# Patient Record
Sex: MALE
[De-identification: names, ages, dates, MRNs, and addresses within clinical notes are randomized per-mention and may not be internally consistent; named-entity substitution may affect disease eponyms.]

---

## 2022-02-07 PROBLEM — Z00.00 ENCOUNTER FOR PREVENTIVE HEALTH EXAMINATION: Status: ACTIVE | Noted: 2022-02-07

## 2022-02-14 ENCOUNTER — APPOINTMENT (OUTPATIENT)
Dept: PHARMACY | Facility: CLINIC | Age: 62
End: 2022-02-14
Payer: SELF-PAY

## 2022-02-14 PROCEDURE — V5010 ASSESSMENT FOR HEARING AID: CPT | Mod: NC

## 2022-02-22 ENCOUNTER — APPOINTMENT (OUTPATIENT)
Dept: PHARMACY | Facility: CLINIC | Age: 62
End: 2022-02-22
Payer: SELF-PAY

## 2022-02-22 PROCEDURE — V5010 ASSESSMENT FOR HEARING AID: CPT | Mod: NC

## 2022-03-22 ENCOUNTER — APPOINTMENT (OUTPATIENT)
Dept: PHARMACY | Facility: CLINIC | Age: 62
End: 2022-03-22
Payer: SELF-PAY

## 2022-03-22 PROCEDURE — V5261I: CUSTOM

## 2022-04-20 ENCOUNTER — APPOINTMENT (OUTPATIENT)
Dept: PHARMACY | Facility: CLINIC | Age: 62
End: 2022-04-20
Payer: SELF-PAY

## 2022-04-20 PROCEDURE — V5299A: CUSTOM | Mod: NC

## 2022-05-23 ENCOUNTER — APPOINTMENT (OUTPATIENT)
Dept: UROLOGY | Facility: CLINIC | Age: 62
End: 2022-05-23
Payer: COMMERCIAL

## 2022-05-23 VITALS
BODY MASS INDEX: 38.6 KG/M2 | OXYGEN SATURATION: 97 % | SYSTOLIC BLOOD PRESSURE: 146 MMHG | WEIGHT: 285 LBS | HEART RATE: 101 BPM | TEMPERATURE: 98.5 F | HEIGHT: 72 IN | DIASTOLIC BLOOD PRESSURE: 91 MMHG

## 2022-05-23 DIAGNOSIS — K57.90 DIVERTICULOSIS OF INTESTINE, PART UNSPECIFIED, W/OUT PERFORATION OR ABSCESS W/OUT BLEEDING: ICD-10-CM

## 2022-05-23 DIAGNOSIS — Z83.3 FAMILY HISTORY OF DIABETES MELLITUS: ICD-10-CM

## 2022-05-23 DIAGNOSIS — Z78.9 OTHER SPECIFIED HEALTH STATUS: ICD-10-CM

## 2022-05-23 DIAGNOSIS — K46.9 UNSPECIFIED ABDOMINAL HERNIA W/OUT OBSTRUCTION OR GANGRENE: ICD-10-CM

## 2022-05-23 DIAGNOSIS — Z87.891 PERSONAL HISTORY OF NICOTINE DEPENDENCE: ICD-10-CM

## 2022-05-23 DIAGNOSIS — Z86.79 PERSONAL HISTORY OF OTHER DISEASES OF THE CIRCULATORY SYSTEM: ICD-10-CM

## 2022-05-23 DIAGNOSIS — Z82.49 FAMILY HISTORY OF ISCHEMIC HEART DISEASE AND OTHER DISEASES OF THE CIRCULATORY SYSTEM: ICD-10-CM

## 2022-05-23 PROCEDURE — 76857 US EXAM PELVIC LIMITED: CPT

## 2022-05-23 PROCEDURE — 99204 OFFICE O/P NEW MOD 45 MIN: CPT | Mod: 25

## 2022-05-23 RX ORDER — AMLODIPINE BESYLATE 5 MG/1
5 TABLET ORAL
Qty: 90 | Refills: 0 | Status: ACTIVE | COMMUNITY
Start: 2022-03-14

## 2022-05-23 RX ORDER — TAMSULOSIN HYDROCHLORIDE 0.4 MG/1
0.4 CAPSULE ORAL
Qty: 90 | Refills: 0 | Status: ACTIVE | COMMUNITY
Start: 2022-02-02

## 2022-05-23 RX ORDER — METOPROLOL SUCCINATE 25 MG/1
25 TABLET, EXTENDED RELEASE ORAL
Qty: 90 | Refills: 0 | Status: ACTIVE | COMMUNITY
Start: 2022-02-22

## 2022-05-23 RX ORDER — APIXABAN 5 MG/1
5 TABLET, FILM COATED ORAL
Qty: 180 | Refills: 0 | Status: ACTIVE | COMMUNITY
Start: 2022-05-11

## 2022-05-23 RX ORDER — VALSARTAN AND HYDROCHLOROTHIAZIDE 320; 12.5 MG/1; MG/1
320-12.5 TABLET, FILM COATED ORAL
Qty: 90 | Refills: 0 | Status: ACTIVE | COMMUNITY
Start: 2022-01-06

## 2022-05-23 NOTE — PHYSICAL EXAM
[General Appearance - Well Developed] : well developed [General Appearance - Well Nourished] : well nourished [Normal Appearance] : normal appearance [Well Groomed] : well groomed [General Appearance - In No Acute Distress] : no acute distress [Edema] : no peripheral edema [Respiration, Rhythm And Depth] : normal respiratory rhythm and effort [Exaggerated Use Of Accessory Muscles For Inspiration] : no accessory muscle use [Abdomen Soft] : soft [Abdomen Tenderness] : non-tender [Costovertebral Angle Tenderness] : no ~M costovertebral angle tenderness [Penis Abnormality] : normal circumcised penis [Urinary Bladder Findings] : the bladder was normal on palpation [Scrotum] : the scrotum was normal [Testes Tenderness] : no tenderness of the testes [Testes Mass (___cm)] : there were no testicular masses [Rectal Exam - Rectum] : digital rectal exam was normal [Prostate Enlargement] : the prostate was not enlarged [Prostate Tenderness] : the prostate was not tender [No Prostate Nodules] : no prostate nodules [Normal Station and Gait] : the gait and station were normal for the patient's age [] : no rash [No Focal Deficits] : no focal deficits [Oriented To Time, Place, And Person] : oriented to person, place, and time [Affect] : the affect was normal [Mood] : the mood was normal [Not Anxious] : not anxious

## 2022-05-24 ENCOUNTER — NON-APPOINTMENT (OUTPATIENT)
Age: 62
End: 2022-05-24

## 2022-05-24 LAB
ANION GAP SERPL CALC-SCNC: 16 MMOL/L
APPEARANCE: CLEAR
BACTERIA: NEGATIVE
BASOPHILS # BLD AUTO: 0.05 K/UL
BASOPHILS NFR BLD AUTO: 0.5 %
BILIRUBIN URINE: NEGATIVE
BLOOD URINE: NEGATIVE
BUN SERPL-MCNC: 23 MG/DL
CALCIUM SERPL-MCNC: 9.7 MG/DL
CHLORIDE SERPL-SCNC: 107 MMOL/L
CO2 SERPL-SCNC: 21 MMOL/L
COLOR: YELLOW
CREAT SERPL-MCNC: 0.98 MG/DL
EGFR: 87 ML/MIN/1.73M2
EOSINOPHIL # BLD AUTO: 0.12 K/UL
EOSINOPHIL NFR BLD AUTO: 1.2 %
GLUCOSE QUALITATIVE U: NEGATIVE
GLUCOSE SERPL-MCNC: 89 MG/DL
HCT VFR BLD CALC: 50.9 %
HGB BLD-MCNC: 16.3 G/DL
HYALINE CASTS: 0 /LPF
IMM GRANULOCYTES NFR BLD AUTO: 0.4 %
KETONES URINE: NEGATIVE
LEUKOCYTE ESTERASE URINE: NEGATIVE
LYMPHOCYTES # BLD AUTO: 2.09 K/UL
LYMPHOCYTES NFR BLD AUTO: 20.4 %
MAN DIFF?: NORMAL
MCHC RBC-ENTMCNC: 29.1 PG
MCHC RBC-ENTMCNC: 32 GM/DL
MCV RBC AUTO: 90.7 FL
MICROSCOPIC-UA: NORMAL
MONOCYTES # BLD AUTO: 0.7 K/UL
MONOCYTES NFR BLD AUTO: 6.8 %
NEUTROPHILS # BLD AUTO: 7.26 K/UL
NEUTROPHILS NFR BLD AUTO: 70.7 %
NITRITE URINE: NEGATIVE
PH URINE: 5.5
PLATELET # BLD AUTO: 287 K/UL
POTASSIUM SERPL-SCNC: 4.3 MMOL/L
PROTEIN URINE: NEGATIVE
PSA SERPL-MCNC: 4.58 NG/ML
RBC # BLD: 5.61 M/UL
RBC # FLD: 13.2 %
RED BLOOD CELLS URINE: 1 /HPF
SODIUM SERPL-SCNC: 144 MMOL/L
SPECIFIC GRAVITY URINE: 1.02
SQUAMOUS EPITHELIAL CELLS: 0 /HPF
UROBILINOGEN URINE: NORMAL
WBC # FLD AUTO: 10.26 K/UL
WHITE BLOOD CELLS URINE: 1 /HPF

## 2022-05-24 NOTE — ADDENDUM
[FreeTextEntry1] : I, Dr. Kong, personally performed the evaluation and management (E/M) services for this new patient.  That E/M includes conducting the initial examination, assessing all conditions, and establishing the plan of care.  Today, my ACP, Nikki Payton NP, was here to observe my evaluation and management services for this patient to be followed going forward.\par

## 2022-05-24 NOTE — HISTORY OF PRESENT ILLNESS
[FreeTextEntry1] : Dear ZACK Sanchez,\par \par Thank you so much for the referral to help care for your patient.\par \par Chief Complaint: PAE consult\par Date of first visit:2022\par \par ANKIT ELMORE is a 61 year old  male with a PMHx HTN, atrial fibrillation, and BPH who presents for PAE consult. His PSA is 8.56 ng/ml. PSAD is normal (0.02 ng/ml/cc). He is on dutasteride and tamsulosin. He has a history of hydronephrosis due to BPH. \par  \par He denies frequency, urgency, and incomplete emptying. Moderate FOS. Occasional dribbling. Leaks urine occasionally, does not need pads. Nocturia- x1. He denies dysuria and hematuria. \par \par No issues with erections. He reports delayed ejaculation. \par \par 10 pack year smoking history.\par \par PSA Hx:\par 8.56 22 (corrected for dutasteride)\par 10.8 21 (corrected for dutasteride)\par 12.2 05/15/21 (corrected for dutasteride)\par 16.24 12/15/20  (started dutasteride)\par \par MRI\par MRI 2020. 292 cc prostate with PIRADS 4 lesion to the left posteromedial midgland peripheral zone measuring 21 x 7 mm. PIRADS 2 measuring 18 x 14 mm to the right posterior midgland transition zone. No LAD No EPE, No Bony Lesions.  The images have been reviewed and clinical implications discussed with the patient.\par \par 2022\par IPSS 11 QOL 2 \par SADIE 19\par FlowPVR: Max: 13.5 Av.6 ml/s VV-193 ml PVR- 66 ml\par \par The patient denies fevers, chills, nausea and or vomiting and no unexplained weight loss.\par \par All pertinent laboratory, films and physician notes were reviewed.  Questionnaire results were discussed with patient.\par \par I discussed with the patient and reviewed the risks and benefits and alternatives to prostate artery embolization. Time was spent with the patient discussing alternative therapies including TURP, Urolift, medical therapy, laser, HoLEP, SPP and embolization.\par \par We discussed the early result and success of the procedure in general as well as my personal results. Specifically, reviewed risk related to nontarget embolization most commonly involving the bladder and/or rectum. We also spoke about the potential for post procedure obstruction required murphy catheter drainage. We reviewed some technical aspects up of the procedure including embolic material utilizing the importance of cone beam CT prior to embolization. The patient wishes procedure scheduling a procedure.\par \par Specific risks of the embolization procedure which were discussed with the patient including infection, bleeding, dysuria, hematuria, hematospermia, non-target embolization which would result in damage to bladder wall leading to ulceration/ischemia, rectal wall injury, and injury to penis and ejaculatory mechanism. These risks are considered to be low. Pelvic pain and burning is not uncommon and considered to be mild and transient. The patient understands that a Murphy catheter may be inserted during the procedure to help guide embolization and will be removed at the end of the procedure. A small percentage of patients will have some degree of urinary retention after embolization and will require a catheter to be left in. He understands this. We also discussed that long-term results of prostate embolization are unknown but response rates in reduction of his IPSS score are 80-85% based on current literature and state of the art techniques.\par \par It was explained to the patient that approximate 5% patient experienced some degree bruising following femoral or radial artery catheterization. The hematoma is benign and resolves spontaneously under typical circumstances. The specific benefits and risks of transradial (TR) access versus transfemoral (TF) access were discussed in detail with the patient. This includes decreased risk of bleeding, immediate ambulation and faster discharge time. Potential increased and extremely remote risk of cerebral emboli was discussed. The patient was given information packet with further details. The patient does indicate preference for transradial access during the procedure.\par \par \par \par

## 2022-05-24 NOTE — ASSESSMENT
[FreeTextEntry1] : 61 year old  male with a PMHx HTN, atrial fibrillation, and BPH who presents for PAE consult. His PSA is 8.56 ng/ml. PSAD is normal (0.02 ng/ml/cc). He is on dutasteride and tamsulosin. He has a history of hydronephrosis due to BPH.\par  \par 6'2\par 3 mm left radial artery \par Barbau B  \par Pre void bladder volume : 250.16 cc\par Post void bladder residual: 66.33 cc\par Bladder wall thickness: 0.16 cm\par Prostate gland measured : 8.3 cm x 6.6 cm x 7.4 cm\par \par Prostate gland volume: 211.37 cc\par 1. CBC, BMP, PSA, Covid Test, UA UCx, EKG, CXR\par 2. Cardiac Clearance\par 3. PAE at [facility]\par 4. PIRADS 4 lesion- Repeat MRI at [facility] \par 5. Stop dutasteride at least 2 weeks before procedure. \par 6. Plan for murphy at least 7 days post op. \par \par Thank you very much for allowing me to assist in the care of this patient. Should you have any additional questions or concerns please do not hesitate to contact me.\par \par \par Sincerely,\par \par \par Rashad Kong D.O.\par  of Urology and Radiology\par  of Urology at [facility]\par System Director for Prostate Cancer\par [address] [facility], [address]\par Phone: [phone]\par

## 2022-05-25 ENCOUNTER — NON-APPOINTMENT (OUTPATIENT)
Age: 62
End: 2022-05-25

## 2022-05-25 LAB — BACTERIA UR CULT: NORMAL

## 2022-06-07 ENCOUNTER — NON-APPOINTMENT (OUTPATIENT)
Age: 62
End: 2022-06-07

## 2022-06-20 ENCOUNTER — RESULT REVIEW (OUTPATIENT)
Age: 62
End: 2022-06-20

## 2022-06-20 ENCOUNTER — OUTPATIENT (OUTPATIENT)
Dept: OUTPATIENT SERVICES | Facility: HOSPITAL | Age: 62
LOS: 1 days | End: 2022-06-20

## 2022-06-20 ENCOUNTER — APPOINTMENT (OUTPATIENT)
Dept: MRI IMAGING | Facility: CLINIC | Age: 62
End: 2022-06-20
Payer: COMMERCIAL

## 2022-06-20 PROCEDURE — 72197 MRI PELVIS W/O & W/DYE: CPT | Mod: 26

## 2022-06-21 ENCOUNTER — NON-APPOINTMENT (OUTPATIENT)
Age: 62
End: 2022-06-21

## 2022-06-25 ENCOUNTER — TRANSCRIPTION ENCOUNTER (OUTPATIENT)
Age: 62
End: 2022-06-25

## 2022-06-27 ENCOUNTER — APPOINTMENT (OUTPATIENT)
Dept: UROLOGY | Facility: CLINIC | Age: 62
End: 2022-06-27

## 2022-06-27 VITALS
SYSTOLIC BLOOD PRESSURE: 136 MMHG | HEART RATE: 92 BPM | OXYGEN SATURATION: 97 % | DIASTOLIC BLOOD PRESSURE: 82 MMHG | TEMPERATURE: 97.7 F

## 2022-06-27 PROCEDURE — 99214 OFFICE O/P EST MOD 30 MIN: CPT

## 2022-06-27 NOTE — PHYSICAL EXAM
[General Appearance - Well Developed] : well developed [General Appearance - Well Nourished] : well nourished [Normal Appearance] : normal appearance [Well Groomed] : well groomed [General Appearance - In No Acute Distress] : no acute distress [] : no respiratory distress [Respiration, Rhythm And Depth] : normal respiratory rhythm and effort [Exaggerated Use Of Accessory Muscles For Inspiration] : no accessory muscle use [Oriented To Time, Place, And Person] : oriented to person, place, and time [Affect] : the affect was normal [Mood] : the mood was normal [Normal Station and Gait] : the gait and station were normal for the patient's age

## 2022-06-28 NOTE — HISTORY OF PRESENT ILLNESS
[FreeTextEntry1] : Dear ZACK Sanchez\par \par Thank you so much for the referral to help care for your patient.\par \par Chief Complaint: MRI Review\par Date of first visit:2022\par \par ANKIT ELMROE is a 61 year old  male with a PMHx HTN, atrial fibrillation, and BPH who is scheduled for bilateral PAE 22, but presents for MRI review today. A prostate MRI done on 2022 demonstrated a PIRADS 4 lesion to the left posteromedial midgland peripheral zone, and a PIRADS 2 lesion to the right posterior midgland transition zone. His most recent prostate MRI done on 22 was read as PIRADS 1. His PSA is 9.16 ng/ml. PSAD is normal (0.017 ng/ml/cc). He is on dutasteride and tamsulosin. He has a history of hydronephrosis due to BPH. \par  \par He denies frequency, urgency, and incomplete emptying. Moderate FOS. Occasional dribbling. Leaks urine occasionally, does not need pads. Nocturia- x1. He denies dysuria and hematuria. \par \par No issues with erections. He reports delayed ejaculation. \par \par 10 pack year smoking history.\par \par PSA Hx:\par 9.16 2022 (corrected for dutasteride) (PSAD 0.017 ng/ml/cc)\par 8.56 22 (corrected for dutasteride)\par 10.8 21 (corrected for dutasteride)\par 12.2 05/15/21 (corrected for dutasteride)\par 16.24 12/15/20  (started dutasteride)\par \par MRI\par MRI read 2022. 259.6 cc prostate with PIRADS 1 no suspicious lesions identified. No LAD No EPE, No Bony Lesions.  The images have been reviewed and clinical implications discussed with the patient.\par \par MRI 2020. 292 cc prostate with PIRADS 4 lesion to the left posteromedial midgland peripheral zone measuring 21 x 7 mm. PIRADS 2 measuring 18 x 14 mm to the right posterior midgland transition zone. No LAD No EPE, No Bony Lesions.  The images have been reviewed and clinical implications discussed with the patient.\par \par \par 2022\par IPSS 13  QOL 2\par SADIE 19\par \par 2022\par IPSS 11 QOL 2 \par SADIE 19\par FlowPVR: Max: 13.5 Av.6 ml/s VV-193 ml PVR- 66 ml\par \par The patient denies fevers, chills, nausea and or vomiting and no unexplained weight loss.\par \par All pertinent laboratory, films and physician notes were reviewed.  Questionnaire results were discussed with patient.\par \par I discussed with the patient and reviewed the risks and benefits and alternatives to prostate artery embolization. []  minutes was spent with the patient discussing alternative therapies including TURP, urolift, medical therapy, laser, HoLEP, SPP and embolization. \par \par We discussed the early result and success of the procedure in general as well as my personal results. Specifically, reviewed risk related to nontarget embolization most commonly involving the bladder and/or rectum. We also spoke about the potential for post procedure obstruction required murphy catheter drainage. We reviewed some technical aspects up of the procedure including embolic material utilizing the importance of cone beam CT prior to embolization. The patient wishes procedure scheduling a procedure.\par \par Specific risks of the embolization procedure which were discussed with the patient including infection, bleeding, dysuria, hematuria, hematospermia, non-target embolization which would result in damage to bladder wall leading to ulceration/ischemia, rectal wall injury, and injury to penis and ejaculatory mechanism.  These risks are considered to be low.  Pelvic pain and burning is not uncommon and considered to be mild and transient. The patient understands that a Murphy catheter may be inserted during the procedure to help guide embolization and will be removed at the end of the procedure. A small percentage of patients will have some degree of urinary retention after embolization and will require a catheter to be left in. He understands this.  We also discussed that long-term results of prostate embolization are unknown but response rates in reduction of his IPSS score are 80-85% based on current literature and state of the art techniques. \par \par It was explained to the patient that approximate 5% patient experienced some degree bruising following femoral or radial artery catheterization.  The hematoma is benign and resolves spontaneously under typical circumstances. The specific benefits and risks of transradial (TR) access versus transfemoral (TF) access were discussed in detail with the patient. This includes decreased risk of bleeding, immediate ambulation and faster discharge time. Potential increased and extremely remote risk of cerebral emboli was discussed.  The patient was given information packet with further details. The patient does indicate preference for transradial access during the procedure.  We also discussed use of NBCA.\par \par \par \par \par \par

## 2022-06-28 NOTE — ASSESSMENT
[FreeTextEntry1] : 61 year old  male with a PMHx HTN, atrial fibrillation, and BPH who is scheduled for bilateral PAE 7/12/22, but presents for MRI review today. A prostate MRI done on 12/23/2022 demonstrated a PIRADS 4 lesion to the left posteromedial midgland peripheral zone, and a PIRADS 2 lesion to the right posterior midgland transition zone. His most recent prostate MRI done on 06/21/22 was read as PIRADS 1. His PSA is 9.16 ng/ml. PSAD is normal (0.017 ng/ml/cc). He is on dutasteride and tamsulosin. \par  \par 6'2\par 3 mm left radial artery \par Barbau B  \par Pre void bladder volume : 250.16 cc\par Post void bladder residual: 66.33 cc\par Bladder wall thickness: 0.16 cm\par Prostate gland measured : 8.3 cm x 6.6 cm x 7.4 cm\par Prostate gland volume: 211.37 cc\par \par 1. CBC, BMP, PSA, Covid Test, UA UCx, EKG, CXR\par 2. Cardiac Clearance. Hold Eliquis for 3 full days prior to procedure per his cardiologist. \par 3. PAE at [facility] using NBCA\par 4. MR Prostate - PIRADS 1\par 5. Stop dutasteride at least 2 weeks before procedure. (Last dose is today, 06/27/22)\par 6. Plan for murphy 6 days post op, Monday removal. \par \par Thank you very much for allowing me to assist in the care of this patient. Should you have any additional questions or concerns please do not hesitate to contact me.\par \par \par Sincerely,\par \par \par Rasahd Kong D.O.\par  of Urology and Radiology\par  of Urology at [facility]\par System Director for Prostate Cancer\par [address] [facility], [facility]\par Phone: [phone]

## 2022-07-07 ENCOUNTER — NON-APPOINTMENT (OUTPATIENT)
Age: 62
End: 2022-07-07

## 2022-07-07 LAB — BACTERIA UR CULT: NORMAL

## 2022-07-12 ENCOUNTER — OUTPATIENT (OUTPATIENT)
Dept: OUTPATIENT SERVICES | Facility: HOSPITAL | Age: 62
LOS: 1 days | End: 2022-07-12
Payer: COMMERCIAL

## 2022-07-12 ENCOUNTER — RESULT REVIEW (OUTPATIENT)
Age: 62
End: 2022-07-12

## 2022-07-12 ENCOUNTER — APPOINTMENT (OUTPATIENT)
Dept: UROLOGY | Facility: HOSPITAL | Age: 62
End: 2022-07-12

## 2022-07-12 ENCOUNTER — APPOINTMENT (OUTPATIENT)
Dept: INTERVENTIONAL RADIOLOGY/VASCULAR | Facility: HOSPITAL | Age: 62
End: 2022-07-12

## 2022-07-12 PROCEDURE — 76937 US GUIDE VASCULAR ACCESS: CPT | Mod: 26

## 2022-07-12 PROCEDURE — 37243 VASC EMBOLIZE/OCCLUDE ORGAN: CPT

## 2022-07-12 PROCEDURE — C1889: CPT

## 2022-07-12 PROCEDURE — C1894: CPT

## 2022-07-12 PROCEDURE — C1887: CPT

## 2022-07-12 PROCEDURE — 76937 US GUIDE VASCULAR ACCESS: CPT

## 2022-07-12 PROCEDURE — 36247 INS CATH ABD/L-EXT ART 3RD: CPT

## 2022-07-12 PROCEDURE — C1769: CPT

## 2022-07-12 PROCEDURE — 36247 INS CATH ABD/L-EXT ART 3RD: CPT | Mod: 59

## 2022-07-13 ENCOUNTER — EMERGENCY (EMERGENCY)
Facility: HOSPITAL | Age: 62
LOS: 1 days | Discharge: HOME | End: 2022-07-13
Attending: EMERGENCY MEDICINE | Admitting: EMERGENCY MEDICINE
Payer: COMMERCIAL

## 2022-07-13 ENCOUNTER — NON-APPOINTMENT (OUTPATIENT)
Age: 62
End: 2022-07-13

## 2022-07-13 VITALS
DIASTOLIC BLOOD PRESSURE: 82 MMHG | OXYGEN SATURATION: 97 % | HEIGHT: 74 IN | WEIGHT: 265 LBS | HEART RATE: 115 BPM | SYSTOLIC BLOOD PRESSURE: 150 MMHG | TEMPERATURE: 98 F | RESPIRATION RATE: 20 BRPM

## 2022-07-13 VITALS
DIASTOLIC BLOOD PRESSURE: 84 MMHG | RESPIRATION RATE: 20 BRPM | SYSTOLIC BLOOD PRESSURE: 142 MMHG | TEMPERATURE: 98 F | OXYGEN SATURATION: 98 % | HEART RATE: 98 BPM

## 2022-07-13 DIAGNOSIS — R33.9 RETENTION OF URINE, UNSPECIFIED: ICD-10-CM
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
## 2022-07-13 DIAGNOSIS — N40.0 BENIGN PROSTATIC HYPERPLASIA WITHOUT LOWER URINARY TRACT SYMPTOMS: ICD-10-CM

## 2022-07-13 DIAGNOSIS — T83.038A LEAKAGE OF OTHER URINARY CATHETER, INITIAL ENCOUNTER: ICD-10-CM
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
## 2022-07-13 PROCEDURE — 99284 EMERGENCY DEPT VISIT MOD MDM: CPT | Mod: 25

## 2022-07-13 PROCEDURE — 51702 INSERT TEMP BLADDER CATH: CPT

## 2022-07-13 PROCEDURE — 99283 EMERGENCY DEPT VISIT LOW MDM: CPT

## 2022-07-13 PROCEDURE — 99282 EMERGENCY DEPT VISIT SF MDM: CPT

## 2022-07-13 NOTE — ED PROVIDER NOTE - ATTENDING APP SHARED VISIT CONTRIBUTION OF CARE
I discussed the care of the pt directly with the ACP while the pt was in the ED. I have reviewed the ACP note and agree w/ the history, exam and plan of care.

## 2022-07-13 NOTE — ED ADULT NURSE NOTE - CHIEF COMPLAINT QUOTE
Pt reports burning with urination, and urinating around murphy catheter. Pt reports murphy cath placed today after prostate sx.

## 2022-07-13 NOTE — ED PROVIDER NOTE - PATIENT PORTAL LINK FT
You can access the FollowMyHealth Patient Portal offered by [facility] by registering at the following website: [email or web address]. By joining [employer]’s FollowMyHealth portal, you will also be able to view your health information using other applications (apps) compatible with our system.

## 2022-07-13 NOTE — ED PROVIDER NOTE - PHYSICAL EXAMINATION
Constitutional : Well appearing, non-toxic, no acute distress. calm and cooperative. awake, alert, oriented to person, place, time/situation.  Head : head normocephalic, atraumatic  EENMT : eyes clear bilaterally, PERRL, EOMI. airway patent. moist mucous membranes. neck supple.  Cardiac :  Extremities warm and well perfused. 2+ radial and DP pulses. cap refill <2 seconds  Resp : Respirations even and unlabored.   Gastro : abdomen soft, nontender, nondistended. no rebound or guarding. no CVAT.  MSK :  range of motion is not limited, no muscle or joint tenderness  Neuro : Alert and oriented, no focal deficits, no motor or sensory deficits.  Skin : Skin normal color for race, warm, dry and intact. No evidence of rash.

## 2022-07-13 NOTE — ED ADULT NURSE REASSESSMENT NOTE - NS ED NURSE REASSESS COMMENT FT1
RN attempted to flush murphy per MD orders but unable to due to patient reporting pain. MD made aware. pt pending urology consult. pt verbalizes understanding of plan of care

## 2022-07-13 NOTE — CONSULT NOTE ADULT - SUBJECTIVE AND OBJECTIVE BOX
HPI: 60 yo male with BPH- s/p bilateral PAE 7/12/2022. Patient went home after procedure with murphy catheter. Patient noticed few hours after returning home that murphy wasn't draining well and was draining light pink urine around murphy. No fever/chills. No N/V. Murphy not flushing in ER.      PAST MEDICAL & SURGICAL HISTORY:      MEDICATIONS  (STANDING):    MEDICATIONS  (PRN):      Allergies    No Known Allergies    Intolerances        SOCIAL HISTORY:    FAMILY HISTORY:      Vital Signs Last 24 Hrs  T(C): 36.8 (13 Jul 2022 02:02), Max: 36.9 (13 Jul 2022 00:43)  T(F): 98.3 (13 Jul 2022 02:02), Max: 98.5 (13 Jul 2022 00:43)  HR: 98 (13 Jul 2022 02:02) (98 - 115)  BP: 142/84 (13 Jul 2022 02:02) (142/84 - 150/82)  BP(mean): --  RR: 20 (13 Jul 2022 02:02) (20 - 20)  SpO2: 98% (13 Jul 2022 02:02) (97% - 98%)    Parameters below as of 13 Jul 2022 02:02  Patient On (Oxygen Delivery Method): room air        On PE:  General: alert and awake  Abdomen: soft, NT, ND    : mild SP discomfort. FC #16fr intact. No urine noted in bag.     EXT: no edema    LABS: n/a                RADIOLOGY & ADDITIONAL STUDIES:

## 2022-07-13 NOTE — CONSULT NOTE ADULT - PROBLEM SELECTOR RECOMMENDATION 9
-murphy removed and #18fr murphy placed. Drained about 300cc clear urine.  -d/c to home with murphy to leg bag.   -f/u Dr. Kong monday for TOV

## 2022-07-13 NOTE — ED PROVIDER NOTE - NSFOLLOWUPINSTRUCTIONS_ED_ALL_ED_FT
You were seen today for a catheter problem.   Our urology team saw you in the ED tonight and swapped the murphy.    Follow up with Dr Arevalo as previously scheduled.     Return to the Emergency Department for persistent, worsening or new symptoms including fever/chills, severe abdominal pain, blood in your urine or blood clots or if the murphy stops working / draining for any reason, uncontrollable nausea/vomiting, or any other concerns.

## 2022-07-13 NOTE — ED PROVIDER NOTE - CLINICAL SUMMARY MEDICAL DECISION MAKING FREE TEXT BOX
pt w history BPH- s/p bilateral PAE yesterday (7/12/22) w Dr. Kong. discharged w murphy. now here w urine leaking around catheter. no abd pain, n/v. no flank pain. pink tinged urine, no hematuria.   pt well appearing on arrival, comfortable. small amount pink tinged urine in murphy tubing.   will attempt to flush in ED, if unsuccessful will call urology to swap murphy given recent procedure/ instrumentation.   no indication for labs as pt was never retaining urine, had been leaking around murphy.

## 2022-07-13 NOTE — ED PROVIDER NOTE - PROGRESS NOTE DETAILS
attempted to flush murphy in ED. unsuccessful.   urology consulted. swapped murphy to larger side.  murphy draining without difficulty now.   pt ok for dc and continued outpt follow up with uro as already planned.     Discharge plan and return precautions d/w pt who verbalized understanding and agrees with plan. All questions answered. Vitals WNL. Ready for d/c.

## 2022-07-13 NOTE — ED PROVIDER NOTE - OBJECTIVE STATEMENT
61 yr old male, history of BPH- s/p bilateral PAE yesterday (7/12/22) w Dr. Kong, presents to the Emergency Department with chest pain. Pt was discharged this afternoon with murphy in place. no issues with murphy initially. this evening pt noticed murphy wasn't draining well. Urine was leaking around the murphy. pink tinged urine. no hematuria, no clots. no pain or swelling. no suprapubic pain / abdominal pain / flank pain.   urine has always been draining. was never retaining.

## 2022-07-13 NOTE — ED PROVIDER NOTE - NS ED MD DISPO DISCHARGE CCDA
Pharmacy requesting medication refill. Please approve or deny this request.    Rx requested:  Requested Prescriptions     Pending Prescriptions Disp Refills    metoprolol tartrate (LOPRESSOR) 50 MG tablet [Pharmacy Med Name: Metoprolol Tartrate Oral Tablet 50 MG] 180 tablet 0     Sig: TAKE ONE-HALF TABLET BY MOUTH TWICE DAILY         Last Office Visit:   4/19/2019      Next Visit Date:  No future appointments.
Patient/Caregiver provided printed discharge information.

## 2022-07-13 NOTE — ED ADULT NURSE NOTE - OBJECTIVE STATEMENT
Pt reports burning with urination, and urinating around murphy catheter. Pt reports murphy cath placed today after prostate sx.    triage noted as above    pt states that he had 2 episodes of voiding clear yellow urine into murphy bag. then a couple of hours ago he had an episode where half of "rohit colored" urine went into the murphy bag and half came out around the catheter. states he is concerned for infection and thinks there might be a clot blocking the catheter. denies any fevers/chills, abnormal discharge or odor.

## 2022-07-14 PROCEDURE — 51702 INSERT TEMP BLADDER CATH: CPT

## 2022-07-15 ENCOUNTER — APPOINTMENT (OUTPATIENT)
Dept: UROLOGY | Facility: CLINIC | Age: 62
End: 2022-07-15

## 2022-07-15 PROCEDURE — 99024 POSTOP FOLLOW-UP VISIT: CPT

## 2022-07-16 ENCOUNTER — EMERGENCY (EMERGENCY)
Facility: HOSPITAL | Age: 62
LOS: 1 days | Discharge: HOME | End: 2022-07-16
Attending: EMERGENCY MEDICINE | Admitting: EMERGENCY MEDICINE
Payer: COMMERCIAL

## 2022-07-16 VITALS
WEIGHT: 274.92 LBS | OXYGEN SATURATION: 96 % | RESPIRATION RATE: 16 BRPM | TEMPERATURE: 98 F | SYSTOLIC BLOOD PRESSURE: 111 MMHG | DIASTOLIC BLOOD PRESSURE: 64 MMHG | HEART RATE: 88 BPM | HEIGHT: 74 IN

## 2022-07-16 DIAGNOSIS — T83.9XXA UNSPECIFIED COMPLICATION OF GENITOURINARY PROSTHETIC DEVICE, IMPLANT AND GRAFT, INITIAL ENCOUNTER: ICD-10-CM

## 2022-07-16 PROCEDURE — 99284 EMERGENCY DEPT VISIT MOD MDM: CPT

## 2022-07-16 PROCEDURE — 51702 INSERT TEMP BLADDER CATH: CPT

## 2022-07-16 NOTE — CONSULT NOTE ADULT - PROBLEM SELECTOR RECOMMENDATION 9
-not retaining, stat lock placed to keep murphy in position  -will keep his follow up appt. on Monday for trial of void

## 2022-07-16 NOTE — ED PROVIDER NOTE - PROGRESS NOTE DETAILS
- advanced murphy and placed more in balloon.  +150cc urine output. bedside US - +murphy balloon within bladder, no evidence of urinary retention    I have discussed the discharge plan with the patient. The patient agrees with the plan, as discussed.  The patient understands Emergency Department diagnosis is a preliminary diagnosis often based on limited information and that the patient must adhere to the follow-up plan as discussed.  The patient understands that if the symptoms worsen the patient may return to the Emergency Department at any time for further evaluation and treatment.

## 2022-07-16 NOTE — ED ADULT TRIAGE NOTE - CHIEF COMPLAINT QUOTE
pt c/o murphy catheter slipping out.  pt s/p prostate embolization  7/12/22.  catheter is still in place, but ~4-5 inches lower than it usually is and not draining, per pt.

## 2022-07-16 NOTE — ED PROVIDER NOTE - PATIENT PORTAL LINK FT
You can access the FollowMyHealth Patient Portal offered by [facility] by registering at the following website: [email or web address]. By joining [employer]’s FollowMyHealth portal, you will also be able to view your health information using other applications (apps) compatible with our system.

## 2022-07-16 NOTE — ED ADULT NURSE NOTE - OBJECTIVE STATEMENT
pt had prostate embolisation on monday, murphy placed and stopped draining on 7/14, visited ER and had murphy replaced from 16Fr to 18Fr.  pt has appt with urologist on monday, murphy stopped draining again and has been "slipping out".  denies pain, fever, chills, hematuria, n/v

## 2022-07-16 NOTE — CONSULT NOTE ADULT - SUBJECTIVE AND OBJECTIVE BOX
61 y.o. M patient s/p PAE 7/12. Here to have murphy checked.  Was seen in ER 7/16 and was upsized to 18F for malfunctioning murphy.  Subsequently seen in office yesterday with concern for murphy not draining well.  It was readjusted in office and patient sent home.  He presents today with concerns that murphy was pulled down because he doesn't have a stat lock and feels that urine output is lower.    PAST MEDICAL & SURGICAL HISTORY:  BPH  HTN     ALLERGIES:  No Known Allergies    PHYSICAL EXAM:  Vital Signs Last 24 Hrs  T(C): 36.9 (16 Jul 2022 03:14), Max: 36.9 (16 Jul 2022 03:14)  T(F): 98.5 (16 Jul 2022 03:14), Max: 98.5 (16 Jul 2022 03:14)  HR: 88 (16 Jul 2022 03:14) (88 - 88)  BP: 111/64 (16 Jul 2022 03:14) (111/64 - 111/64)  BP(mean): --  RR: 16 (16 Jul 2022 03:14) (16 - 16)  SpO2: 96% (16 Jul 2022 03:14) (96% - 96%)    Parameters below as of 16 Jul 2022 03:14  Patient On (Oxygen Delivery Method): room air    Gen: NAD, AAOx3  : 18F murphy in place.  At time of initial exam, clear yellow urine draining in tubing. Murphy flushing well.  Approximately 60ccc urine removed from bladder.  Adjusted murphy with 25cc in balloon, placed a stat lock to keep it in appropriate position.      Bedside ultrasound shows murphy appropriately in bladder, minimal urine.         61 y.o. M patient s/p PAE 7/12. Here to have murphy checked.  Was seen in ER 7/16 and was upsized to 18F for malfunctioning murphy.  Subsequently seen in office yesterday with concern for murphy not draining well.  It was readjusted in office and patient sent home.  He presents today with concerns that murphy was pulled down because he doesn't have a stat lock and feels that urine output is lower.    PAST MEDICAL & SURGICAL HISTORY:  BPH  HTN     ALLERGIES:  No Known Allergies    PHYSICAL EXAM:  Vital Signs Last 24 Hrs  T(C): 36.9 (16 Jul 2022 03:14), Max: 36.9 (16 Jul 2022 03:14)  T(F): 98.5 (16 Jul 2022 03:14), Max: 98.5 (16 Jul 2022 03:14)  HR: 88 (16 Jul 2022 03:14) (88 - 88)  BP: 111/64 (16 Jul 2022 03:14) (111/64 - 111/64)  BP(mean): --  RR: 16 (16 Jul 2022 03:14) (16 - 16)  SpO2: 96% (16 Jul 2022 03:14) (96% - 96%)    Parameters below as of 16 Jul 2022 03:14  Patient On (Oxygen Delivery Method): room air    Gen: NAD, AAOx3  : 18F murphy in place.  At time of initial exam, clear yellow urine draining in tubing. Murphy flushing well.  Approximately 60ccc urine removed from bladder. Cleaned tubing with chloroprep. Adjusted murphy with 25cc in balloon, placed a stat lock to keep it in appropriate position.      Bedside ultrasound shows murphy appropriately in bladder, minimal urine.

## 2022-07-16 NOTE — ED PROVIDER NOTE - OBJECTIVE STATEMENT
61M hx BPH, htn, s/p prostatic embolization, c/o problem with murphy catheter. pt had murphy replaced (16fr to 18Fr) 7/14 as urine was leaking around the tube. states today murphy seemed to come out a little and hasn't been draining. no fevers. no vomiting, no abd pain. no flank pain. pt states per Dr. Kong he is supposed to have the murphy until monday.

## 2022-07-16 NOTE — CONSULT NOTE ADULT - ASSESSMENT
60 yo. M patient with concern for murphy malfunction.  Murphy adjusted with 25cc in balloon and stat lock placed.  It is draining clear yellow urine.  Was not retaining large volume, approximately 60cc was in bladder.

## 2022-07-16 NOTE — ED PROVIDER NOTE - CLINICAL SUMMARY MEDICAL DECISION MAKING FREE TEXT BOX
concerned that murphy not draining and had come out slightly, no abd pain, no urinary urgency  - consulted

## 2022-07-18 ENCOUNTER — APPOINTMENT (OUTPATIENT)
Dept: UROLOGY | Facility: CLINIC | Age: 62
End: 2022-07-18

## 2022-07-18 VITALS
DIASTOLIC BLOOD PRESSURE: 80 MMHG | OXYGEN SATURATION: 98 % | SYSTOLIC BLOOD PRESSURE: 128 MMHG | HEART RATE: 84 BPM | TEMPERATURE: 97.34 F

## 2022-07-18 DIAGNOSIS — T83.031A LEAKAGE OF INDWELLING URETHRAL CATHETER, INITIAL ENCOUNTER: ICD-10-CM

## 2022-07-18 DIAGNOSIS — N40.0 BENIGN PROSTATIC HYPERPLASIA WITHOUT LOWER URINARY TRACT SYMPTOMS: ICD-10-CM
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
## 2022-07-18 DIAGNOSIS — R39.198 OTHER DIFFICULTIES WITH MICTURITION: ICD-10-CM
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
## 2022-07-18 DIAGNOSIS — I10 ESSENTIAL (PRIMARY) HYPERTENSION: ICD-10-CM

## 2022-07-18 PROCEDURE — 51798 US URINE CAPACITY MEASURE: CPT

## 2022-07-18 PROCEDURE — 99024 POSTOP FOLLOW-UP VISIT: CPT

## 2022-07-18 RX ORDER — DUTASTERIDE 0.5 MG/1
0.5 CAPSULE, LIQUID FILLED ORAL
Qty: 90 | Refills: 0 | Status: COMPLETED | COMMUNITY
Start: 2022-05-10 | End: 2022-07-18

## 2022-07-18 NOTE — PHYSICAL EXAM
[General Appearance - Well Developed] : well developed [General Appearance - Well Nourished] : well nourished [Normal Appearance] : normal appearance [Well Groomed] : well groomed [General Appearance - In No Acute Distress] : no acute distress [Urinary Bladder Findings] : the bladder was normal on palpation [Oriented To Time, Place, And Person] : oriented to person, place, and time [Affect] : the affect was normal [Mood] : the mood was normal [Not Anxious] : not anxious [Normal Station and Gait] : the gait and station were normal for the patient's age

## 2022-07-18 NOTE — HISTORY OF PRESENT ILLNESS
[FreeTextEntry1] : 63 yo male s/p bilateral PAE with NBCA on 7/12/22. Hx of retention and 259cc prostate. Presents for TOV. He did have to go to the ER again over the weekend for advancement of catheter again (inflated with 30cc up from 20cc).\par \par Bag draining clear yellow urine\par 200 cc sterile water instilled\par Voided  150cc clear red urine, OK flow\par PVR 26cc\par \par \par

## 2022-07-18 NOTE — HISTORY OF PRESENT ILLNESS
[FreeTextEntry1] : 63 yo male s/p PAE 7/12/22 who presents for catheter bag not draining. In no acute distress or pain.\par \par Bladder scan 90cc\par

## 2022-07-18 NOTE — PHYSICAL EXAM
[General Appearance - Well Developed] : well developed [General Appearance - Well Nourished] : well nourished [Normal Appearance] : normal appearance [Well Groomed] : well groomed [General Appearance - In No Acute Distress] : no acute distress [Abdomen Soft] : soft [Abdomen Tenderness] : non-tender [Urinary Bladder Findings] : the bladder was normal on palpation [Oriented To Time, Place, And Person] : oriented to person, place, and time [Affect] : the affect was normal [Mood] : the mood was normal [Not Anxious] : not anxious [Normal Station and Gait] : the gait and station were normal for the patient's age

## 2022-07-18 NOTE — ASSESSMENT
[FreeTextEntry1] : 63 yo male who presents for TOV s/p bilateral PAE with NBCA on 7/12/22. Hx of retention and 259cc prostate.\par \par 1. TOV- passed. PVR 26\par 2. Continue flomax\par 3. Advised on return precautions\par \par Follow up 3-4 weeks with Flow/PVR\par \par

## 2022-07-18 NOTE — ASSESSMENT
[FreeTextEntry1] : 63 yo male s/p bilateral PAE with NBCA on 7/12/22. Hx of retention and 259cc prostate.\par \par Catheter in prostatic urethra likely\par Catheter advanced to the hub\par Draining clear yellow urine without difficulty\par No hematuria or clots appreciated\par Advised on return precautions and Rice care\par \par Follow up 7/18/22 for TOV.

## 2022-07-19 ENCOUNTER — APPOINTMENT (OUTPATIENT)
Dept: UROLOGY | Facility: CLINIC | Age: 62
End: 2022-07-19

## 2022-07-19 VITALS
DIASTOLIC BLOOD PRESSURE: 78 MMHG | OXYGEN SATURATION: 98 % | HEART RATE: 88 BPM | SYSTOLIC BLOOD PRESSURE: 147 MMHG | TEMPERATURE: 208.94 F

## 2022-07-19 PROCEDURE — 51798 US URINE CAPACITY MEASURE: CPT

## 2022-07-19 PROCEDURE — 99024 POSTOP FOLLOW-UP VISIT: CPT

## 2022-07-19 NOTE — HISTORY OF PRESENT ILLNESS
[FreeTextEntry1] : 63 yo male s/p bilateral PAE with NBCA on 7/12/22. Hx of retention and 259cc prostate. Passed TOV yesterday. Presents this AM with weak flow but "not a dribble" and sensation of "pissing through a pin hole". Denies dysuria, fever, flank pain. Has not been able to take his Flomax today- he [place] of [place] medication [place] the city.\par \par \par \par \par

## 2022-07-19 NOTE — ASSESSMENT
[FreeTextEntry1] : 63 yo male s/p bilateral PAE with NBCA on 7/12/22. Hx of retention and 259cc prostate. Passed TOV yesterday. Presents this AM with weak flow but "not a dribble" and sensation of "pissing through a pin hole". Denies dysuria, fever, flank pain. Has not been able to take his Flomax today- he ran out of his medication [place] the city.\par \par 1. \par 2. Discussed normal SE of PAE including frequency, urgency, slight dysuria\par 3. UA, UCx\par 4. Given dose of Bactrim in office. Sent Rx to take for total of a week. Will stop after 3d if culture negative\par 5. Refilled Flomax. Increase to 0.8mg until follow up in 3 weeks\par 6. Encouraged hydration

## 2022-07-20 LAB
APPEARANCE: ABNORMAL
BACTERIA: ABNORMAL
BILIRUBIN URINE: NEGATIVE
BLOOD URINE: ABNORMAL
COLOR: ABNORMAL
GLUCOSE QUALITATIVE U: NEGATIVE
HYALINE CASTS: 0 /LPF
KETONES URINE: NORMAL
LEUKOCYTE ESTERASE URINE: ABNORMAL
MICROSCOPIC-UA: NORMAL
NITRITE URINE: NEGATIVE
PH URINE: 6
PROTEIN URINE: ABNORMAL
RED BLOOD CELLS URINE: 6 /HPF
SPECIFIC GRAVITY URINE: 1.03
SQUAMOUS EPITHELIAL CELLS: 0 /HPF
UROBILINOGEN URINE: NORMAL
WHITE BLOOD CELLS URINE: 311 /HPF

## 2022-07-25 ENCOUNTER — NON-APPOINTMENT (OUTPATIENT)
Age: 62
End: 2022-07-25

## 2022-07-25 LAB — BACTERIA UR CULT: ABNORMAL

## 2022-08-02 LAB
APPEARANCE: CLEAR
BACTERIA: NEGATIVE
BILIRUBIN URINE: NEGATIVE
BLOOD URINE: NEGATIVE
COLOR: YELLOW
GLUCOSE QUALITATIVE U: NEGATIVE
HYALINE CASTS: 3 /LPF
KETONES URINE: NEGATIVE
LEUKOCYTE ESTERASE URINE: NEGATIVE
MICROSCOPIC-UA: NORMAL
NITRITE URINE: NEGATIVE
PH URINE: 6.5
PROTEIN URINE: ABNORMAL
RED BLOOD CELLS URINE: 3 /HPF
SPECIFIC GRAVITY URINE: 1.02
SQUAMOUS EPITHELIAL CELLS: 1 /HPF
UROBILINOGEN URINE: NORMAL
WHITE BLOOD CELLS URINE: 2 /HPF

## 2022-08-03 ENCOUNTER — NON-APPOINTMENT (OUTPATIENT)
Age: 62
End: 2022-08-03

## 2022-08-03 LAB — BACTERIA UR CULT: NORMAL

## 2022-08-22 ENCOUNTER — APPOINTMENT (OUTPATIENT)
Dept: UROLOGY | Facility: CLINIC | Age: 62
End: 2022-08-22

## 2022-08-22 VITALS
HEART RATE: 92 BPM | BODY MASS INDEX: 34.01 KG/M2 | WEIGHT: 265 LBS | HEIGHT: 74 IN | TEMPERATURE: 97.3 F | SYSTOLIC BLOOD PRESSURE: 142 MMHG | DIASTOLIC BLOOD PRESSURE: 89 MMHG | OXYGEN SATURATION: 97 %

## 2022-08-22 DIAGNOSIS — N40.1 BENIGN PROSTATIC HYPERPLASIA WITH LOWER URINARY TRACT SYMPMS: ICD-10-CM

## 2022-08-22 PROCEDURE — 99212 OFFICE O/P EST SF 10 MIN: CPT

## 2022-08-22 NOTE — PHYSICAL EXAM
[General Appearance - Well Developed] : well developed [General Appearance - Well Nourished] : well nourished [Normal Appearance] : normal appearance [Well Groomed] : well groomed [General Appearance - In No Acute Distress] : no acute distress [Abdomen Soft] : soft [Abdomen Tenderness] : non-tender [Abdomen Mass (___ Cm)] : no abdominal mass palpated [Costovertebral Angle Tenderness] : no ~M costovertebral angle tenderness [Skin Color & Pigmentation] : normal skin color and pigmentation [Skin Lesions] : no skin lesions [Edema] : no peripheral edema [] : no respiratory distress [Exaggerated Use Of Accessory Muscles For Inspiration] : no accessory muscle use [Oriented To Time, Place, And Person] : oriented to person, place, and time [Affect] : the affect was normal [Mood] : the mood was normal [Not Anxious] : not anxious [Normal Station and Gait] : the gait and station were normal for the patient's age [No Focal Deficits] : no focal deficits

## 2022-08-23 NOTE — HISTORY OF PRESENT ILLNESS
[FreeTextEntry1] : Dear ZACK Sanchez\par \par Thank you so much for the referral to help care for your patient.\par \par Chief Complaint: BPH\par Date of first visit:2022\par \par ANKIT ELMORE is a 62 year old  male with a PMHx HTN, atrial fibrillation, and BPH s/p bilateral PAE w/ NBCA 22.  He had a Rice cath postop leading to Klebsiella UTI. A prostate MRI done on 2020 demonstrated a PIRADS 4 lesion to the left posteromedial midgland peripheral zone, and a PIRADS 2 lesion to the right posterior midgland transition zone. His most recent prostate MRI done on 22 was read as PIRADS 1. His PSA is 9.16 ng/ml. PSAD is normal (0.017 ng/ml/cc). He is on dutasteride and tamsulosin. He has a history of hydronephrosis due to BPH. \par  \par He denies frequency, urgency, and incomplete emptying. Moderate FOS. Occasional dribbling. Leaks urine occasionally, does not need pads. Nocturia- x1. He denies dysuria and hematuria. \par \par No issues with erections both pre-and post PAE. He reports delayed ejaculation, likely related to alpha blocker use. \par \par 10 pack year smoking history.\par \par PSA Hx:\par 9.16 2022 (corrected for dutasteride) (PSAD 0.017 ng/ml/cc)\par 8.56 22 (corrected for dutasteride)\par 10.8 21 (corrected for dutasteride)\par 12.2 05/15/21 (corrected for dutasteride)\par 16.24 12/15/20  (started dutasteride)\par \par MRI\par MRI read 2022. 259.6 cc prostate with PIRADS 1 no suspicious lesions identified. No LAD No EPE, No Bony Lesions.  The images have been reviewed and clinical implications discussed with the patient.\par \par MRI 2020. 292 cc prostate with PIRADS 4 lesion to the left posteromedial midgland peripheral zone measuring 21 x 7 mm. PIRADS 2 measuring 18 x 14 mm to the right posterior midgland transition zone. No LAD No EPE, No Bony Lesions.  The images have been reviewed and clinical implications discussed with the patient.\par \par Post op did, well though had to be seen in the ED due to catheter dislodgement.  Reports that he has intense fatigue, but thinks that is related to his recent COVID infection.  Pt states that he is "knows the procedure was a success" and that he "pees like a racehorse".  States that his stream is much stronger than previously and he feels empty when he is finished.  His urgency is completely resolved.  Recently, his nocturia has improved to only 0-1x.  \par \par 2022\par IPSS 7   QOL 1\par SADIE 19\par Flow/PVR: peak flow 13.8ml/s, average 6.7ml/s, VV 47.8cc, PVR 59cc\par \par 2022\par IPSS 13  QOL 2\par SADIE 19\par \par 2022\par IPSS 11 QOL 2 \par SADIE 19\par FlowPVR: Max: 13.5 Av.6 ml/s VV-193 ml PVR- 66 ml\par \par The patient denies fevers, chills, nausea and or vomiting and no unexplained weight loss.\par \par All pertinent laboratory, films and physician notes were reviewed.  Questionnaire results were discussed with patient.

## 2022-10-14 ENCOUNTER — RX RENEWAL (OUTPATIENT)
Age: 62
End: 2022-10-14

## 2022-10-14 RX ORDER — TAMSULOSIN HYDROCHLORIDE 0.4 MG/1
0.4 CAPSULE ORAL
Qty: 90 | Refills: 3 | Status: ACTIVE | COMMUNITY
Start: 2022-07-19 | End: 1900-01-01

## 2023-01-05 ENCOUNTER — APPOINTMENT (OUTPATIENT)
Dept: PHARMACY | Facility: CLINIC | Age: 63
End: 2023-01-05
Payer: SELF-PAY

## 2023-01-05 PROCEDURE — V5299A: CUSTOM | Mod: NC

## 2023-01-09 ENCOUNTER — APPOINTMENT (OUTPATIENT)
Dept: UROLOGY | Facility: CLINIC | Age: 63
End: 2023-01-09
Payer: COMMERCIAL

## 2023-01-09 VITALS
HEART RATE: 87 BPM | SYSTOLIC BLOOD PRESSURE: 154 MMHG | DIASTOLIC BLOOD PRESSURE: 90 MMHG | TEMPERATURE: 97.7 F | OXYGEN SATURATION: 97 %

## 2023-01-09 PROCEDURE — 99024 POSTOP FOLLOW-UP VISIT: CPT

## 2023-01-09 RX ORDER — SULFAMETHOXAZOLE AND TRIMETHOPRIM 800; 160 MG/1; MG/1
800-160 TABLET ORAL TWICE DAILY
Qty: 13 | Refills: 0 | Status: COMPLETED | COMMUNITY
Start: 2022-07-19 | End: 2023-01-09

## 2023-01-10 NOTE — ASSESSMENT
[FreeTextEntry1] : 62 year old  male with a PMHx HTN, atrial fibrillation, and BPH who is s/p for bilateral PAE w/ NBCA 7/12/22.  A prostate MRI done on 12/23/2022 demonstrated a PIRADS 4 lesion to the left posteromedial midgland peripheral zone, and a PIRADS 2 lesion to the right posterior midgland transition zone. His most recent prostate MRI done on 06/21/22 was read as PIRADS 1. His PSA is 9.16 ng/ml. PSAD is normal (0.03 ng/ml/cc). \par  \par 1. Overall happy post PAE (bilateral with NBCA)\par 2. Routine PSA checks with Dr Markham\par 3. Flow/PVRs at follow up\par 4. Continue daily Flomax\par \par Follow up 6 months\par Continue follow ups with Dr Markham \par \par Thank you very much for allowing me to assist in the care of this patient. Should you have any additional questions or concerns please do not hesitate to contact me.\par \par \par Sincerely,\par \par \par Rashad Kong D.O.\par  of Urology and Radiology\par  of Urology at [facility]\par System Director for Prostate Cancer\par [address], [address] [facility], [facility]\par Phone: [phone]\par

## 2023-01-10 NOTE — HISTORY OF PRESENT ILLNESS
[FreeTextEntry1] : Dear ZACK Whitten\par \par Thank you so much for the referral to help care for your patient.\par \par Chief Complaint: BPH\par Date of first visit: 2022\par \par ANKIT ELMORE is a 62 year old  male with a PMHx HTN, atrial fibrillation, and BPH s/p bilateral PAE w/ NBCA 22.  He had a Rice cath postop leading to Klebsiella UTI. A prostate MRI done on 2020 demonstrated a PIRADS 4 lesion to the left posteromedial midgland peripheral zone, and a PIRADS 2 lesion to the right posterior midgland transition zone. His most recent prostate MRI done on 22 was read as PIRADS 1. His PSA is 9.16 ng/ml. PSAD is normal (0.03 ng/ml/cc). He was on dutasteride and tamsulosin. He has a history of hydronephrosis due to BPH.  \par  \par He is happy post embolization. Still taking 0.4mg Flomax daily. He is concerned about retention.\par \par No issues with erections both pre-and post PAE. He reports delayed ejaculation, likely related to alpha blocker use. \par \par PSA Hx:\par 9.16 2022 (corrected for dutasteride) (PSAD 0.03 ng/ml/cc)\par 8.56 22 (corrected for dutasteride)\par 10.8 21 (corrected for dutasteride)\par 12.2 05/15/21 (corrected for dutasteride)\par 16.24 12/15/20  (started dutasteride)\par \par MRI Hx:\par MRI read 2022. 259.6 cc prostate with PIRADS 1 no suspicious lesions identified. No LAD No EPE, No Bony Lesions.  The images have been reviewed and clinical implications discussed with the patient.\par \par MRI 2020. 292 cc prostate with PIRADS 4 lesion to the left posteromedial midgland peripheral zone measuring 21 x 7 mm. PIRADS 2 measuring 18 x 14 mm to the right posterior midgland transition zone. No LAD No EPE, No Bony Lesions.  The images have been reviewed and clinical implications discussed with the patient.\par \par Post op did, well though had to be seen in the ED due to catheter dislodgement.  Reports that he has intense fatigue, but thinks that is related to his recent COVID infection.  Pt states that he is "knows the procedure was a success" and that he "pees like a racehorse".  States that his stream is much stronger than previously and he feels empty when he is finished.  His urgency is completely resolved.  Recently, his nocturia has improved to only 0-1x.  \par \par 2023\par IPSS 6   QOL 1\par SADIE 20\par Flow/PVR: cannot void. PVR 43\par \par 2022\par IPSS 7   QOL 1\par SADIE 19\par Flow/PVR: peak flow 13.8ml/s, average 6.7ml/s, VV 47.8cc, PVR 59cc\par \par 2022\par IPSS 13  QOL 2\par SADIE 19\par \par 2022\par IPSS 11 QOL 2 \par SADIE 19\par FlowPVR: Max: 13.5 Av.6 ml/s VV-193 ml PVR- 66 ml\par \par The patient denies fevers, chills, nausea and or vomiting and no unexplained weight loss.\par \par All pertinent laboratory, films and physician notes were reviewed.  Questionnaire results were discussed with patient.\par

## 2023-01-10 NOTE — ADDENDUM
[FreeTextEntry1] : I, Dr. Kong, personally performed the evaluation and management (E/M) services for this established patient who presents today with (a) new problem(s)/exacerbation of (an) existing condition(s).  That E/M includes conducting the examination, assessing all new/exacerbated conditions, and establishing a new plan of care.  Today, my ACP, Bushra Booker, was here to observe my evaluation and management services for this new problem/exacerbated condition to be followed going forward.\par

## 2023-02-27 ENCOUNTER — APPOINTMENT (OUTPATIENT)
Dept: PHARMACY | Facility: CLINIC | Age: 63
End: 2023-02-27
Payer: SELF-PAY

## 2023-02-27 ENCOUNTER — OUTPATIENT (OUTPATIENT)
Dept: OUTPATIENT SERVICES | Facility: HOSPITAL | Age: 63
LOS: 1 days | Discharge: HOME | End: 2023-02-27

## 2023-02-27 ENCOUNTER — APPOINTMENT (OUTPATIENT)
Dept: SPEECH THERAPY | Facility: CLINIC | Age: 63
End: 2023-02-27

## 2023-02-27 PROCEDURE — V5267M: CUSTOM | Mod: 25

## 2023-02-27 PROCEDURE — V5299A: CUSTOM | Mod: NC

## 2023-02-27 NOTE — PLAN
[FreeTextEntry2] : 1. HAC following today's audio \par 2. Continued use of amplification \par 3. Annual audio or sooner as needed \par 4. Continued otologic monitoring

## 2023-02-27 NOTE — HISTORY OF PRESENT ILLNESS
[FreeTextEntry1] : 62 year old male patient seen today for audiological evaluation. Patient has known bilateral hearing loss which he wears binaural hearing aids to treat. Patient reports increased difficulties in background noise at work. He reports tinnitus but notes that it is not bothersome.

## 2023-02-27 NOTE — PROCEDURE
[] : Acoustic Immittance: [Normal Eardrum Mobility] : consistent with normal eardrum mobility [Type A Tympanogram] : Type A Normal [Good] : good [Insert Ear Phones] : insert ear phones [de-identified] : Excellent speech recognition score, bilaterally. Slight change re: previous audio.  [de-identified] : Mild to moderately-severe sensorineural hearing loss. [de-identified] : Mild to moderately-severe sensorineural hearing loss.

## 2023-02-28 DIAGNOSIS — H90.3 SENSORINEURAL HEARING LOSS, BILATERAL: ICD-10-CM

## 2023-04-19 ENCOUNTER — TRANSCRIPTION ENCOUNTER (OUTPATIENT)
Age: 63
End: 2023-04-19

## 2023-08-07 ENCOUNTER — APPOINTMENT (OUTPATIENT)
Dept: UROLOGY | Facility: CLINIC | Age: 63
End: 2023-08-07
Payer: COMMERCIAL

## 2023-08-07 VITALS
TEMPERATURE: 98.3 F | SYSTOLIC BLOOD PRESSURE: 116 MMHG | OXYGEN SATURATION: 98 % | HEART RATE: 90 BPM | DIASTOLIC BLOOD PRESSURE: 73 MMHG

## 2023-08-07 DIAGNOSIS — Z87.898 PERSONAL HISTORY OF OTHER SPECIFIED CONDITIONS: ICD-10-CM

## 2023-08-07 PROCEDURE — 99214 OFFICE O/P EST MOD 30 MIN: CPT

## 2023-08-07 NOTE — HISTORY OF PRESENT ILLNESS
[FreeTextEntry1] : Dear ZACK Whitten  Thank you so much for the referral to help care for your patient.  Chief Complaint: BPH Date of first visit: 2022  ANKIT ELMORE is a 63 year old  male with a PMHx HTN, atrial fibrillation, and BPH s/p bilateral PAE w/ NBCA 22.  He had a Rice cath postop leading to Klebsiella UTI. A prostate MRI done on 2020 demonstrated a PIRADS 4 lesion to the left posteromedial midgland peripheral zone, and a PIRADS 2 lesion to the right posterior midgland transition zone. His most recent prostate MRI done on 22 was read as PIRADS 1. His PSA is 9.16 ng/ml. PSAD is normal (0.03 ng/ml/cc). He was on dutasteride and tamsulosin. He has a history of hydronephrosis due to BPH.  He is doing well and is altering his medication (increasing flomax to use it up).     Still taking 0.4mg Flomax daily. He is concerned about retention and keeps taking his meds  No issues with erections both pre-and post PAE. He reports delayed ejaculation, likely related to alpha blocker use.   PSA Hx: reports normal PSA now - 2023 - but does not have the results. 9.16    22 (corrected for dutasteride) (PSAD 0.03 ng/ml/cc) 8.56    22 (corrected for dutasteride) 10.8    21 (corrected for dutasteride) 12.2    05/15/21 (corrected for dutasteride) 16.24  12/15/20  (started dutasteride)  MRI Hx: MRI read 2022. 259.6 cc prostate with PIRADS 1 no suspicious lesions identified. No LAD No EPE, No Bony Lesions.  The images have been reviewed and clinical implications discussed with the patient.  MRI 2020. 292 cc prostate with PIRADS 4 lesion to the left posteromedial midgland peripheral zone measuring 21 x 7 mm. PIRADS 2 measuring 18 x 14 mm to the right posterior midgland transition zone. No LAD No EPE, No Bony Lesions.  The images have been reviewed and clinical implications discussed with the patient.  Post op did, well though had to be seen in the ED due to catheter dislodgement.  Reports that he has intense fatigue, but thinks that is related to his recent COVID infection.  Pt states that he is "knows the procedure was a success" and that he "pees like a racehorse".  States that his stream is much stronger than previously and he feels empty when he is finished.  His urgency is completely resolved.  Recently, his nocturia has improved to only 0-1x.    2023 IPSS  5   QOL 0 SADIE 18 (REJ still present) he is on the flomax still Flow/PVR: 12 cc  2023 IPSS 6   QOL 1 SADIE 20 Flow/PVR: cannot void. PVR 43  2022 IPSS 7   QOL 1 SADIE 19 Flow/PVR: peak flow 13.8ml/s, average 6.7ml/s, VV 47.8cc, PVR 59cc  2022 IPSS 13  QOL 2 SADIE 19  2022 IPSS 11 QOL 2  SADIE 19 FlowPVR: Max: 13.5 Av.6 ml/s VV-193 ml PVR- 66 ml  The patient denies fevers, chills, nausea and or vomiting and no unexplained weight loss.  All pertinent laboratory, films and physician notes were reviewed.  Questionnaire results were discussed with patient.

## 2023-08-07 NOTE — PHYSICAL EXAM
[General Appearance - Well Developed] : well developed [General Appearance - Well Nourished] : well nourished [Abdomen Soft] : soft [Oriented To Time, Place, And Person] : oriented to person, place, and time [Not Anxious] : not anxious

## 2023-08-07 NOTE — ASSESSMENT
[FreeTextEntry1] : 63 year old  male with a PMHx HTN, atrial fibrillation, and BPH who is s/p for bilateral PAE w/ NBCA 7/12/22.  A prostate MRI done on 12/23/2022 demonstrated a PIRADS 4 lesion to the left posteromedial midgland peripheral zone, and a PIRADS 2 lesion to the right posterior midgland transition zone. His most recent prostate MRI done on 06/21/22 was read as PIRADS 1. His PSA is 9.16 ng/ml. PSAD is normal (0.03 ng/ml/cc).  He still needs hernia surgery.    ***He is having issues with cataracts (explained to him risks associated with flomax).  He will speak with his optimologist.***    1. Overall happy post PAE (bilateral with NBCA) 2. Routine PSA checks with Dr Markham - he is only yearly checks 3. Flow/PVRs at follow up 4. Continue daily Flomax as he sees fit (as stated above issue with cataracts).  He is still on dutasteride as well - we have discussed side effects he can stop that med at this time. 5. Hydro resolved  (as per patient 2023)   Thank you very much for allowing me to assist in the care of this patient. Should you have any additional questions or concerns please do not hesitate to contact me.   Sincerely,   Rashad Kong D.O.  of Urology and Radiology  of Urology at [facility] Director for Prostate Cancer [address] [place] [address], 5[address] Floor [facility], [facility] Phone: [phone]

## 2024-01-08 ENCOUNTER — APPOINTMENT (OUTPATIENT)
Dept: UROLOGY | Facility: CLINIC | Age: 64
End: 2024-01-08
Payer: COMMERCIAL

## 2024-01-08 VITALS
SYSTOLIC BLOOD PRESSURE: 136 MMHG | OXYGEN SATURATION: 98 % | DIASTOLIC BLOOD PRESSURE: 84 MMHG | HEART RATE: 80 BPM | TEMPERATURE: 98.3 F

## 2024-01-08 DIAGNOSIS — N40.0 BENIGN PROSTATIC HYPERPLASIA WITHOUT LOWER URINARY TRACT SYMPMS: ICD-10-CM

## 2024-01-08 DIAGNOSIS — R97.20 ELEVATED PROSTATE, SPECIFIC ANTIGEN [PSA]: ICD-10-CM

## 2024-01-08 PROCEDURE — 99214 OFFICE O/P EST MOD 30 MIN: CPT

## 2024-01-08 NOTE — PHYSICAL EXAM
[General Appearance - Well Developed] : well developed [General Appearance - Well Nourished] : well nourished [Normal Appearance] : normal appearance [Edema] : no peripheral edema [] : no respiratory distress [Exaggerated Use Of Accessory Muscles For Inspiration] : no accessory muscle use [Abdomen Soft] : soft [Abdomen Tenderness] : non-tender [Normal Station and Gait] : the gait and station were normal for the patient's age [Skin Lesions] : no skin lesions [No Focal Deficits] : no focal deficits [Oriented To Time, Place, And Person] : oriented to person, place, and time

## 2024-01-08 NOTE — ADDENDUM
[FreeTextEntry1] : I, Dr. Kong, personally performed the evaluation and management (E/M) services for this established patient who presents today with (a) new problem(s)/exacerbation of (an) existing condition(s).  That E/M includes conducting the examination, assessing all new/exacerbated conditions, and establishing a new plan of care.  Today, my ACP, Bushra Booker, was here to observe my evaluation and management services for this new problem/exacerbated condition to be followed going forward.

## 2024-01-08 NOTE — HISTORY OF PRESENT ILLNESS
[FreeTextEntry1] : Dear ZACK Whitten  Thank you so much for the referral to help care for your patient.  Chief Complaint: BPH Date of first visit: 2022  ANKIT ELMORE is a 63 year old  male with a PMHx HTN, atrial fibrillation, and BPH s/p bilateral PAE w/ NBCA 22.  He had a Rice cath postop leading to Klebsiella UTI. A prostate MRI done on 2020 demonstrated a PIRADS 4 lesion to the left posteromedial midgland peripheral zone, and a PIRADS 2 lesion to the right posterior midgland transition zone. His most recent prostate MRI done on 22 was read as PIRADS 1. His PSA is 9.16 ng/ml. PSAD is normal (0.03 ng/ml/cc). He was on dutasteride and tamsulosin. He has a history of hydronephrosis due to BPH.  He is doing well and is altering his medication (increasing flomax to use it up).     Still taking 0.4mg Flomax daily. He is concerned about retention and keeps taking his meds  No issues with erections both pre-and post PAE. He reports delayed ejaculation, likely related to alpha blocker use.   Doing well post-PAE, nocturia significantly reduced (8x > 1x) .  PSA Hx: reports normal PSA now - 2023 - but does not have the results. 9.16    22 (corrected for dutasteride) (PSAD 0.03 ng/ml/cc) 8.56    22 (corrected for dutasteride) 10.8    21 (corrected for dutasteride) 12.2    05/15/21 (corrected for dutasteride) 16.24  12/15/20  (started dutasteride)  MRI Hx: MRI read 2022. 259.6 cc prostate with PIRADS 1 no suspicious lesions identified. No LAD No EPE, No Bony Lesions.  The images have been reviewed and clinical implications discussed with the patient.  MRI 2020. 292 cc prostate with PIRADS 4 lesion to the left posteromedial midgland peripheral zone measuring 21 x 7 mm. PIRADS 2 measuring 18 x 14 mm to the right posterior midgland transition zone. No LAD No EPE, No Bony Lesions.  The images have been reviewed and clinical implications discussed with the patient. The patient has had prior negative biopsies several years ago.   Post op did, well though had to be seen in the ED due to catheter dislodgement.  Reports that he has intense fatigue, but thinks that is related to his recent COVID infection.  Pt states that he is "knows the procedure was a success" and that he "pees like a racehorse".  States that his stream is much stronger than previously and he feels empty when he is finished.  His urgency is completely resolved.  Recently, his nocturia has improved to only 0-1x.    2024 IPSS   6    QOL   1 SADIE  19 Flow/PVR   vv not valid (30cc)  2023 IPSS  5   QOL 0 SADIE 18 (REJ still present) he is on the flomax still Flow/PVR: 12 cc  2023 IPSS 6   QOL 1 SADIE 20 Flow/PVR: cannot void. PVR 43  2022 IPSS 7   QOL 1 SADIE 19 Flow/PVR: peak flow 13.8ml/s, average 6.7ml/s, VV 47.8cc, PVR 59cc  2022 IPSS 13  QOL 2 SADIE 19  2022 IPSS 11 QOL 2  SADIE 19 FlowPVR: Max: 13.5 Av.6 ml/s VV-193 ml PVR- 66 ml  The patient denies fevers, chills, nausea and or vomiting and no unexplained weight loss.  All pertinent laboratory, films and physician notes were reviewed.  Questionnaire results were discussed with patient.

## 2024-01-08 NOTE — ASSESSMENT
[FreeTextEntry1] : 63 year old  male with a PMHx HTN, atrial fibrillation, and BPH who is s/p for bilateral PAE w/ NBCA 7/12/22. A prostate MRI done on 12/23/2022 demonstrated a PIRADS 4 lesion to the left posteromedial midgland peripheral zone, and a PIRADS 2 lesion to the right posterior midgland transition zone. His most recent prostate MRI done on 06/21/22 was read as PIRADS 1. His PSA is 9.16 ng/ml. PSAD is normal (0.03 ng/ml/cc). He still needs hernia surgery.  He has a history of cataracts and plans to have corrected this year (2024).   BPH w/ LUTS - Significantly improved symptoms - He discontinued dutasteride and still taking flomax but would like to discontinue - Will trial discontinuing alpha blocker (goran considering he wants to correct his cataracts) - PVR 11cc today (1/8/24)  Elevated PSA - due for yearly checkup in the Spring, has f/u with Dr. Markham. who is his primary urologist.  Thank you very much for allowing me to assist in the care of this patient. Should you have any additional questions or concerns please do not hesitate to contact me.  Sincerely,   Rashad Kong D.O. Professor of Urology and Radiology  of Urology at [facility] Director for Prostate Cancer [address], [address] [facility], [facility] Phone: [phone].

## 2024-06-27 ENCOUNTER — TRANSCRIPTION ENCOUNTER (OUTPATIENT)
Age: 64
End: 2024-06-27

## 2025-01-06 ENCOUNTER — APPOINTMENT (OUTPATIENT)
Dept: UROLOGY | Facility: CLINIC | Age: 65
End: 2025-01-06
Payer: COMMERCIAL

## 2025-01-06 ENCOUNTER — NON-APPOINTMENT (OUTPATIENT)
Age: 65
End: 2025-01-06

## 2025-01-06 VITALS
TEMPERATURE: 97 F | SYSTOLIC BLOOD PRESSURE: 150 MMHG | HEART RATE: 80 BPM | OXYGEN SATURATION: 98 % | DIASTOLIC BLOOD PRESSURE: 80 MMHG

## 2025-01-06 DIAGNOSIS — R97.20 ELEVATED PROSTATE, SPECIFIC ANTIGEN [PSA]: ICD-10-CM

## 2025-01-06 DIAGNOSIS — N40.0 BENIGN PROSTATIC HYPERPLASIA WITHOUT LOWER URINARY TRACT SYMPMS: ICD-10-CM

## 2025-01-06 PROCEDURE — 99213 OFFICE O/P EST LOW 20 MIN: CPT

## 2025-01-07 LAB
PSA FREE FLD-MCNC: 17 %
PSA FREE SERPL-MCNC: 1.96 NG/ML
PSA SERPL-MCNC: 11.4 NG/ML

## 2025-01-11 ENCOUNTER — TRANSCRIPTION ENCOUNTER (OUTPATIENT)
Age: 65
End: 2025-01-11

## 2025-02-26 ENCOUNTER — APPOINTMENT (OUTPATIENT)
Dept: PHARMACY | Facility: CLINIC | Age: 65
End: 2025-02-26
Payer: SELF-PAY

## 2025-02-26 PROCEDURE — V5299A: CUSTOM

## 2025-03-26 ENCOUNTER — APPOINTMENT (OUTPATIENT)
Dept: SPEECH THERAPY | Facility: CLINIC | Age: 65
End: 2025-03-26

## 2025-03-26 ENCOUNTER — APPOINTMENT (OUTPATIENT)
Dept: PHARMACY | Facility: CLINIC | Age: 65
End: 2025-03-26
Payer: SELF-PAY

## 2025-03-26 PROCEDURE — V5299A: CUSTOM
